# Patient Record
Sex: FEMALE | Race: WHITE | NOT HISPANIC OR LATINO | Employment: OTHER | URBAN - METROPOLITAN AREA
[De-identification: names, ages, dates, MRNs, and addresses within clinical notes are randomized per-mention and may not be internally consistent; named-entity substitution may affect disease eponyms.]

---

## 2017-01-30 ENCOUNTER — APPOINTMENT (OUTPATIENT)
Dept: WOMENS IMAGING | Facility: HOSPITAL | Age: 67
End: 2017-01-30

## 2017-01-30 PROCEDURE — G0202 SCR MAMMO BI INCL CAD: HCPCS | Performed by: RADIOLOGY

## 2017-01-30 PROCEDURE — 77067 SCR MAMMO BI INCL CAD: CPT | Performed by: RADIOLOGY

## 2017-01-30 PROCEDURE — 77063 BREAST TOMOSYNTHESIS BI: CPT | Performed by: RADIOLOGY

## 2018-01-31 ENCOUNTER — APPOINTMENT (OUTPATIENT)
Dept: WOMENS IMAGING | Facility: HOSPITAL | Age: 68
End: 2018-01-31

## 2018-01-31 PROCEDURE — 77067 SCR MAMMO BI INCL CAD: CPT | Performed by: RADIOLOGY

## 2018-01-31 PROCEDURE — 77063 BREAST TOMOSYNTHESIS BI: CPT | Performed by: RADIOLOGY

## 2018-01-31 PROCEDURE — MDREVIEWSP: Performed by: RADIOLOGY

## 2019-02-06 ENCOUNTER — APPOINTMENT (OUTPATIENT)
Dept: WOMENS IMAGING | Facility: HOSPITAL | Age: 69
End: 2019-02-06

## 2019-02-06 PROCEDURE — 77067 SCR MAMMO BI INCL CAD: CPT | Performed by: RADIOLOGY

## 2019-02-06 PROCEDURE — 77063 BREAST TOMOSYNTHESIS BI: CPT | Performed by: RADIOLOGY

## 2020-02-11 ENCOUNTER — APPOINTMENT (OUTPATIENT)
Dept: WOMENS IMAGING | Facility: HOSPITAL | Age: 70
End: 2020-02-11

## 2020-02-11 PROCEDURE — 77067 SCR MAMMO BI INCL CAD: CPT | Performed by: RADIOLOGY

## 2020-02-11 PROCEDURE — 77063 BREAST TOMOSYNTHESIS BI: CPT | Performed by: RADIOLOGY

## 2020-02-26 ENCOUNTER — APPOINTMENT (OUTPATIENT)
Dept: WOMENS IMAGING | Facility: HOSPITAL | Age: 70
End: 2020-02-26

## 2020-02-26 PROCEDURE — 76641 ULTRASOUND BREAST COMPLETE: CPT | Performed by: RADIOLOGY

## 2020-02-26 PROCEDURE — G0279 TOMOSYNTHESIS, MAMMO: HCPCS | Performed by: RADIOLOGY

## 2020-02-26 PROCEDURE — 77065 DX MAMMO INCL CAD UNI: CPT | Performed by: RADIOLOGY

## 2020-03-18 ENCOUNTER — APPOINTMENT (OUTPATIENT)
Dept: WOMENS IMAGING | Facility: HOSPITAL | Age: 70
End: 2020-03-18

## 2020-03-18 PROCEDURE — 19081 BX BREAST 1ST LESION STRTCTC: CPT | Performed by: RADIOLOGY

## 2022-03-24 ENCOUNTER — OFFICE VISIT (OUTPATIENT)
Dept: CARDIOLOGY | Facility: CLINIC | Age: 72
End: 2022-03-24

## 2022-03-24 VITALS
DIASTOLIC BLOOD PRESSURE: 92 MMHG | HEART RATE: 81 BPM | SYSTOLIC BLOOD PRESSURE: 150 MMHG | HEIGHT: 63 IN | BODY MASS INDEX: 28.63 KG/M2 | WEIGHT: 161.6 LBS

## 2022-03-24 DIAGNOSIS — I10 ESSENTIAL HYPERTENSION: Primary | ICD-10-CM

## 2022-03-24 DIAGNOSIS — R00.2 PALPITATIONS: ICD-10-CM

## 2022-03-24 PROCEDURE — 99204 OFFICE O/P NEW MOD 45 MIN: CPT | Performed by: INTERNAL MEDICINE

## 2022-03-24 PROCEDURE — 93000 ELECTROCARDIOGRAM COMPLETE: CPT | Performed by: INTERNAL MEDICINE

## 2022-03-24 RX ORDER — SPIRONOLACTONE 25 MG/1
25 TABLET ORAL DAILY
Qty: 30 TABLET | Refills: 11 | Status: SHIPPED | OUTPATIENT
Start: 2022-03-24 | End: 2022-05-04

## 2022-03-24 RX ORDER — TIZANIDINE 2 MG/1
TABLET ORAL
COMMUNITY
Start: 2022-03-22

## 2022-03-24 RX ORDER — LOSARTAN POTASSIUM 25 MG/1
25 TABLET ORAL DAILY
COMMUNITY
Start: 2022-02-19 | End: 2022-03-24

## 2022-03-24 RX ORDER — GABAPENTIN 100 MG/1
CAPSULE ORAL 2 TIMES DAILY
COMMUNITY
Start: 2022-02-26

## 2022-04-01 ENCOUNTER — LAB (OUTPATIENT)
Dept: LAB | Facility: HOSPITAL | Age: 72
End: 2022-04-01

## 2022-04-01 DIAGNOSIS — I10 ESSENTIAL HYPERTENSION: ICD-10-CM

## 2022-04-01 LAB
ANION GAP SERPL CALCULATED.3IONS-SCNC: 10 MMOL/L (ref 5–15)
BUN SERPL-MCNC: 10 MG/DL (ref 8–23)
BUN/CREAT SERPL: 11.5 (ref 7–25)
CALCIUM SPEC-SCNC: 9.7 MG/DL (ref 8.6–10.5)
CHLORIDE SERPL-SCNC: 104 MMOL/L (ref 98–107)
CO2 SERPL-SCNC: 28 MMOL/L (ref 22–29)
CREAT SERPL-MCNC: 0.87 MG/DL (ref 0.57–1)
EGFRCR SERPLBLD CKD-EPI 2021: 72.2 ML/MIN/1.73
GLUCOSE SERPL-MCNC: 106 MG/DL (ref 65–99)
POTASSIUM SERPL-SCNC: 4 MMOL/L (ref 3.5–5.2)
SODIUM SERPL-SCNC: 142 MMOL/L (ref 136–145)

## 2022-04-01 PROCEDURE — 36415 COLL VENOUS BLD VENIPUNCTURE: CPT

## 2022-04-01 PROCEDURE — 80048 BASIC METABOLIC PNL TOTAL CA: CPT

## 2022-04-01 NOTE — PROGRESS NOTES
Called and spoke to her.  Gave her the results.  She feels much better on her current medications.  We are going to keep her regimen the same.  I will see her back in 6 months.    GM

## 2022-04-19 NOTE — PROGRESS NOTES
Date of Office Visit: 2022  Encounter Provider: Oswaldo Gonzalez MD  Place of Service: [facility] CARDIOLOGY  Patient Name: Astrid Webber  :1950    Chief complaint: Hypertension, palpitations.    History of Present Illness:    I again had the pleasure of seeing the patient in cardiology office on 3/24/2022.  She is a very pleasant 71 year-old female with a history of hypertension and hyperlipidemia who presents for follow-up.  The patient is a retired RN.  I also have not seen her since 2016, and she is considered a new patient today.    I initially saw the patient in 2016 for palpitations.  Her work-up was ultimately unrevealing, and her verapamil was adjusted which helped her symptoms.  She presents today with issues with her blood pressure.  She has had a multitude of personal issues recently, including the death of her , parents, and daughter all within a short time span.  Her blood pressure has been elevated, which she feels is very likely partially related to stress.  She was recently placed on losartan 100 mg/day, although her blood pressure bottomed out, and then this was reduced to 50 mg/day.  However, her blood pressure again bottomed out.  Her verapamil was then increased to twice a day, and she is on 25 mg of losartan.  She feels poorly on the losartan in general, and does not feel like this is a good medication for her.  Her systolic blood pressure has been in the 120s to 140s, although sometimes lower.  Her pulse is mainly been in the 80s.  She also does not want to take the verapamil twice a day unless absolutely necessary.  She has not had any chest pain or shortness of breath.    Past Medical History:   Diagnosis Date   • Colon polyp    • GERD (gastroesophageal reflux disease)    • Hyperlipidemia    • Hypertension        Past Surgical History:   Procedure Laterality Date   • ENDOSCOPY AND COLONOSCOPY  2013    Upper Gastrointestinal  "Endoscopy with Dr Ambika Durán @ [facility]     • HYSTERECTOMY         Current Outpatient Medications on File Prior to Visit   Medication Sig Dispense Refill   • Cetirizine HCl 10 MG capsule Take  by mouth.     • famotidine (PEPCID) 20 MG tablet Take 20 mg by mouth 2 (two) times a day.     • fluticasone (FLONASE) 50 MCG/ACT nasal spray 2 sprays into the nostril(s) as directed by provider As Needed for Rhinitis. Administer 2 sprays in each nostril for each dose.     • folic acid (FOLVITE) 1 MG tablet Take 1 mg by mouth.     • gabapentin (NEURONTIN) 100 MG capsule 2 (Two) Times a Day.     • HYDROcodone-acetaminophen (NORCO) 5-325 MG per tablet Take 1 tablet by mouth every 6 (six) hours.     • Multiple Minerals-Vitamins (MULTI GHAZAL MINERALS) tablet Take  by mouth.     • tiZANidine (ZANAFLEX) 2 MG tablet      • verapamil SR (CALAN-SR) 120 MG CR tablet Take 1 tablet by mouth every night. 30 tablet 11   • verapamil SR (CALAN-SR) 240 MG CR tablet Take 1 tablet by mouth every morning. 30 tablet 11   • vitamin C (ASCORBIC ACID) 250 MG tablet Take 500 mg by mouth.     • LORazepam (ATIVAN) 0.5 MG tablet Take 0.5 mg by mouth every 6 (six) hours.     • oxyCODONE-acetaminophen (PERCOCET)  MG per tablet Take 1 tablet by mouth.     • zolpidem (AMBIEN) 10 MG tablet Take 10 mg by mouth daily.       No current facility-administered medications on file prior to visit.     Allergies as of 03/24/2022   • (No Known Allergies)     Social History     Socioeconomic History   • Marital status: Unknown   Tobacco Use   • Smoking status: Former Smoker   • Smokeless tobacco: Never Used   • Tobacco comment: Quit in her early 30's;   caffeine use- denies   Substance and Sexual Activity   • Alcohol use: Yes     Comment: \"rare\"   • Drug use: No     Family History   Problem Relation Age of Onset   • Breast cancer Mother    • Other Mother         Type I diabetes    • Hypertension Mother    • Colon cancer Father    • Hypertension Sister  " "  • Hypertension Sister    • Hypertension Sister    • Colon cancer Brother    • Stroke Daughter        Review of Systems   Constitutional: Positive for malaise/fatigue.   All other systems reviewed and are negative.     Objective:     Vitals:    03/24/22 1033   BP: 150/92   BP Location: Left arm   Pulse: 81   Weight: 73.3 kg (161 lb 9.6 oz)   Height: 160 cm (63\")     Body mass index is 28.63 kg/m².    Constitutional:       Appearance: Healthy appearance. Well-developed.   Eyes:      Conjunctiva/sclera: Conjunctivae normal.   HENT:      Head: Normocephalic and atraumatic.   Pulmonary:      Effort: Pulmonary effort is normal.      Breath sounds: Normal breath sounds.   Cardiovascular:      Normal rate. Regular rhythm.      Murmurs: There is no murmur.      No gallop.   Edema:     Peripheral edema absent.   Abdominal:      Palpations: Abdomen is soft.      Tenderness: There is no abdominal tenderness.   Musculoskeletal:      Cervical back: Neck supple. Skin:     General: Skin is warm.   Neurological:      Mental Status: Alert and oriented to person, place, and time.   Psychiatric:         Behavior: Behavior normal.       Lab Review:     ECG 12 Lead    Date/Time: 3/24/2022 10:31 AM  Performed by: Oswaldo Gonzalez MD  Authorized by: Oswaldo Gonzalez MD   Comparison: compared with previous ECG from 5/12/2020  Similar to previous ECG  Rhythm: sinus rhythm  Rate: normal  BPM: 81  Other findings: left atrial abnormality    Clinical impression: non-specific ECG                 Cardiac Procedures:      Assessment:       Diagnosis Plan   1. Essential hypertension  Basic Metabolic Panel   2. Palpitations       Plan:       Again, the patient has had issues with her blood pressure as described above.  Her blood pressure bottomed out on losartan at both 100 mg and 50 mg doses.  She is now on 25 mg of losartan, as well as verapamil 240 mg in the morning and 120 mg in the evening.  She does not want to take verapamil twice a " day unless absolutely necessary, and she feels poorly on the losartan in general.  We discussed this in detail.  I feel an ideal solution would be to get her off of the losartan and try her on something else.  We can consolidate the verapamil to 360 mg once a day.  I will try her on spironolactone 25 mg/day to see if she feels better on this medication.  I will check a basic metabolic panel in 2 weeks, and call her to see if this is making any difference.  I will determine follow-up based on her response.

## 2022-04-21 ENCOUNTER — TELEPHONE (OUTPATIENT)
Dept: CARDIOLOGY | Facility: CLINIC | Age: 72
End: 2022-04-21

## 2022-04-21 NOTE — TELEPHONE ENCOUNTER
I feel an ideal solution would be to get her off of the losartan and try her on something else.  We can consolidate the verapamil to 360 mg once a day.  I will try her on spironolactone 25 mg/day to see if she feels better on this medication.    Pt called stating that she feels much better with the addition of Spironolactone, however she states that hse is still having palpitations and racing heart rate and would like to know if you would be ok with her taking an extra 120 mg of the Verapamil to help with the palpitations

## 2022-04-28 NOTE — TELEPHONE ENCOUNTER
I called spoke with pt and gave her message from Dr MARLEY.   She understood.   He concern  Was if B/P was still to low  Would be OK to hold Spironolactone ?   She will take B/P to see where it is before taking Spironolactone..   I verbally addressed with Dr MARLEY is OK to hold Spironolactone if b/p low.   Pt will keep us update if any issue arise  Epifanio MCKEON

## 2022-04-28 NOTE — TELEPHONE ENCOUNTER
04/27/22@ 4:07   Pt called stating that with increase of Verapamil to 400 mg  And Spironolactone 25 mg she states helped palpitations but  her B/P is low. She is having some SOA, dizzines and feels weird. Her b/p yesterday morning was 107/53 and yesterday afternoon  113/48.   She is taking her Verapmil in morning and not evening like before because of palpitations.   Please advise on what needs to ap?   Pt's call back # [phone]    Thanks Epifanio MCKEON

## 2022-04-28 NOTE — TELEPHONE ENCOUNTER
Epifanio,    Taking all the medication in the morning is likely driving her blood pressure too low.  Unfortunately, she will either have to take some of the verapamil in the afternoon or in the evening, or she will need to take all of the verapamil in the morning and push the spironolactone until the afternoon so that her blood pressure does not drop as low.  Can you please let her know?  Thanks.    GM

## 2022-05-04 ENCOUNTER — OFFICE VISIT (OUTPATIENT)
Dept: CARDIOLOGY | Facility: CLINIC | Age: 72
End: 2022-05-04

## 2022-05-04 VITALS
HEIGHT: 63 IN | SYSTOLIC BLOOD PRESSURE: 132 MMHG | WEIGHT: 161.8 LBS | HEART RATE: 87 BPM | BODY MASS INDEX: 28.67 KG/M2 | OXYGEN SATURATION: 99 % | DIASTOLIC BLOOD PRESSURE: 82 MMHG

## 2022-05-04 DIAGNOSIS — R00.2 PALPITATIONS: ICD-10-CM

## 2022-05-04 DIAGNOSIS — I10 ESSENTIAL HYPERTENSION: Primary | ICD-10-CM

## 2022-05-04 PROCEDURE — 99214 OFFICE O/P EST MOD 30 MIN: CPT | Performed by: NURSE PRACTITIONER

## 2022-05-04 NOTE — PROGRESS NOTES
"    CARDIOLOGY        Patient Name: Astrid Webber  :1950  Age: 71 y.o.  Primary Cardiologist: Rahgu Gonzalez MD  Encounter Provider:  LUPE Begum    Date of Service: 22          CHIEF COMPLAINT / REASON FOR OFFICE VISIT     Follow-up, Hypertension, and Palpitations      HISTORY OF PRESENT ILLNESS       HPI  Astrid Webber is a 71 y.o. female who presents today for reevaluation of hypertension.     Pt has a  history significant for hypertension, palpitations.    Patient was evaluated by Dr. Gonzalez 3/24/2022 where she was found to have elevated blood pressure readings.  At last appointment patient was placed on spironolactone and repeat BMP was ordered.  Patient then added an extra dose of verapamil to help control palpitations and then her blood pressure was too low.  She is now holding spironolactone as a result.    Patient states that she has held spironolactone for the last several days secondary to low blood pressure in lightheadedness.  She states that she has been adjusting her verapamil dose to try to control her heart palpitations.  Reports that this morning she took verapamil 360 mg/day and states that blood pressure has been controlled in the 130s.  She does state that she believes anxiety causes some of her palpitations but then the palpitations make her even more anxious.  Denies any dyspnea, angina, edema.  She denies caffeine use but admits that she does not drink enough water.      The following portions of the patient's history were reviewed and updated as appropriate: allergies, current medications, past family history, past medical history, past social history, past surgical history and problem list.      VITAL SIGNS     Visit Vitals  /82   Pulse 87   Ht 160 cm (62.99\")   Wt 73.4 kg (161 lb 12.8 oz)   SpO2 99%   BMI 28.67 kg/m²         Wt Readings from Last 3 Encounters:   22 73.4 kg (161 lb 12.8 oz)   22 73.3 kg (161 lb 9.6 oz)   16 71.9 kg (158 " lb 8 oz)     Body mass index is 28.67 kg/m².      REVIEW OF SYSTEMS   Review of Systems   Constitutional: Negative for chills, fever, weight gain and weight loss.   Cardiovascular: Positive for palpitations. Negative for leg swelling.   Respiratory: Negative for cough, snoring and wheezing.    Hematologic/Lymphatic: Negative for bleeding problem. Does not bruise/bleed easily.   Skin: Negative for color change.   Musculoskeletal: Negative for falls, joint pain and myalgias.   Gastrointestinal: Negative for melena.   Genitourinary: Negative for hematuria.   Neurological: Positive for light-headedness. Negative for excessive daytime sleepiness.   Psychiatric/Behavioral: Negative for depression. The patient is not nervous/anxious.            PHYSICAL EXAMINATION     Vitals and nursing note reviewed.   Constitutional:       Appearance: Normal appearance. Well-developed.   Eyes:      Conjunctiva/sclera: Conjunctivae normal.   Neck:      Vascular: No carotid bruit.   Pulmonary:      Breath sounds: Normal breath sounds.   Cardiovascular:      Normal rate. Regular rhythm. Normal S1 with normal intensity. Normal S2 with normal intensity.      Murmurs: There is no murmur.      No gallop. No click. No rub.   Edema:     Peripheral edema absent.   Musculoskeletal: Normal range of motion. Skin:     General: Skin is warm and dry.   Neurological:      Mental Status: Alert and oriented to person, place, and time.      GCS: GCS eye subscore is 4. GCS verbal subscore is 5. GCS motor subscore is 6.   Psychiatric:         Speech: Speech normal.         Behavior: Behavior normal.         Thought Content: Thought content normal.         Judgment: Judgment normal.           REVIEWED DATA     Procedures    Cardiac Procedures:  1. Echocardiogram 6/15/2016.  All LV wall segments contract normally.  LVEF 56%.      BUN   Date Value Ref Range Status   04/01/2022 10 8 - 23 mg/dL Final   09/17/2020 11 7 - 20 mg/dL Final     Creatinine   Date Value  Ref Range Status   04/01/2022 0.87 0.57 - 1.00 mg/dL Final   09/17/2020 0.70 0.7 - 1.5 mg/dL Final     Potassium   Date Value Ref Range Status   04/01/2022 4.0 3.5 - 5.2 mmol/L Final   09/17/2020 3.5 3.5 - 5.1 mmol/L Final     ALT (SGPT)   Date Value Ref Range Status   09/17/2020 51 (H) 0 - 35 U/L Final     Comment:     If ALT testing ordered prior to 2359 on 11/16/19, please use reference range: Male 0-50, Female 0-35.  [facility] switched to a new ALT assay on 11/16/19 which yields results 10 U/L lower than the old assay.     AST (SGOT)   Date Value Ref Range Status   09/17/2020 46 15 - 46 U/L Final           ASSESSMENT & PLAN      Diagnosis Plan   1. Essential hypertension     2. Palpitations           SUMMARY/DISCUSSION  1. Hypertension.  Blood pressure currently controlled with verapamil 360 mg/day.  She did take spironolactone which improved her blood pressure readings but then caused hypotension.  Patient notes to monitor her blood pressure to ensure systolic blood pressure less than 140.  She will make adjustments to verapamil dosing based on palpitations and will take an additional 120 mg tablet for systolic blood pressure greater than 145.  2. Palpitations.  Patient states that palpitations have increased over the past few weeks.  She is now on verapamil 360 mg in the morning and the seem to be more controlled.  She was educated that inadequate water intake will make palpitations worse and I do wonder if adding spironolactone may have dehydrated the patient to cause worsening palpitations.  She will use verapamil 120 mg tablet as needed in addition to her daily dose for palpitations.  3. Encouraged increased water intake.  Keep previously scheduled appointment with Dr. Gonzalez.        MEDICATIONS         Discharge Medications          Accurate as of May 4, 2022  9:46 AM. If you have any questions, ask your nurse or doctor.            Continue These Medications      Instructions Start  Date   Cetirizine HCl 10 MG capsule   Oral      famotidine 20 MG tablet  Commonly known as: PEPCID   20 mg, Oral, 2 Times Daily      fluticasone 50 MCG/ACT nasal spray  Commonly known as: FLONASE   2 sprays, Nasal, As Needed, Administer 2 sprays in each nostril for each dose.       folic acid 1 MG tablet  Commonly known as: FOLVITE   1 mg, Oral      gabapentin 100 MG capsule  Commonly known as: NEURONTIN   2 Times Daily      HYDROcodone-acetaminophen 5-325 MG per tablet  Commonly known as: NORCO   1 tablet, Oral, Every 6 Hours      LORazepam 0.5 MG tablet  Commonly known as: ATIVAN   0.5 mg, Oral, Every 6 Hours      Multi Kadeem Minerals tablet   Oral      oxyCODONE-acetaminophen  MG per tablet  Commonly known as: PERCOCET   1 tablet, Oral      tiZANidine 2 MG tablet  Commonly known as: ZANAFLEX   No dose, route, or frequency recorded.      verapamil  MG CR tablet  Commonly known as: CALAN-SR   240 mg, Oral, Every Morning      verapamil  MG CR tablet  Commonly known as: CALAN-SR   120 mg, Oral, Nightly      vitamin C 250 MG tablet  Commonly known as: ASCORBIC ACID   500 mg, Oral      zolpidem 10 MG tablet  Commonly known as: AMBIEN   10 mg, Oral, Daily         Stop These Medications    spironolactone 25 MG tablet  Commonly known as: ALDACTONE  Stopped by: LUPE Begum                **Dragon Disclaimer:   Much of this encounter note is an electronic transcription/translation of spoken language to printed text. The electronic translation of spoken language may permit erroneous, or at times, nonsensical words or phrases to be inadvertently transcribed. Although I have reviewed the note for such errors, some may still exist.

## 2022-11-07 PROBLEM — R00.2 PALPITATIONS: Status: ACTIVE | Noted: 2022-11-07

## 2022-11-07 PROBLEM — I10 PRIMARY HYPERTENSION: Status: ACTIVE | Noted: 2022-11-07

## 2022-11-07 NOTE — PROGRESS NOTES
Subjective:     Encounter Date:11/08/2022      Patient ID: Astrid Webber is a 72 y.o. female.    Chief Complaint:follow up HTN, palpitations  History of Present Illness  This is a 73 y/o woman who follows with Dr. Gonzalez and is new to me today. He will be following with .... moving forward. He has a pmhx of hypertension and palpitations. In March 2022, she saw Dr. Gonzalez in office and had elevated blood pressure readings. She was started on spironolactone. She had complaints of palpitations and an extra dose of verapamil was added to control her palpitations. As a result, she became hypotensive and spironolactone had to be held. She then saw LUPE Lester in office in May 2022 and she was continuing to hold spironolactone due to low blood pressure. She had been adjusting her verapamil dose to control her palpitations. She was instructed to continue monitoring her blood pressure and take an additional 120mg tab of verapamil for palpitations if BP greater than 145.    She is here today for a follow up visit. She reports improvement in her blood pressure control. She is taking spironolactone 25 mg daily and verapamil 360 mg daily. She takes both in the morning and her blood pressure remains controlled throughout the day and evening. She denies any chest pain, shortness of breath or palpitations. She denies dizziness or syncope. She denies lower extremity edema, orthopnea or PND. She expresses she knows she needs to lose weight but it is difficult for her to exercise due to her osteoarthritis. She goes to pain management for her OA and is on Norco which works well. She has a lot of family stressors including losing her daughter last year and her sister had a stroke recently. Despite this, her blood pressure has been good.    I have reviewed and updated as appropriate allergies, current medications, past family history, past medical history, past surgical history and problem list.    Review of Systems    Constitutional: Negative for fever, malaise/fatigue, weight gain and weight loss.   HENT: Negative for congestion, hoarse voice and sore throat.    Eyes: Negative for blurred vision and double vision.   Cardiovascular: Negative for chest pain, dyspnea on exertion, leg swelling, orthopnea, palpitations and syncope.   Respiratory: Negative for cough, shortness of breath and wheezing.    Musculoskeletal: Positive for joint pain.   Gastrointestinal: Negative for abdominal pain, hematemesis, hematochezia and melena.   Genitourinary: Negative for dysuria and hematuria.   Neurological: Negative for dizziness, headaches, light-headedness and numbness.   Psychiatric/Behavioral: Negative for depression. The patient is not nervous/anxious.          Current Outpatient Medications:   •  Cetirizine HCl 10 MG capsule, Take  by mouth., Disp: , Rfl:   •  famotidine (PEPCID) 20 MG tablet, Take 20 mg by mouth 2 (two) times a day., Disp: , Rfl:   •  folic acid (FOLVITE) 1 MG tablet, Take 1 mg by mouth., Disp: , Rfl:   •  gabapentin (NEURONTIN) 100 MG capsule, 2 (Two) Times a Day., Disp: , Rfl:   •  HYDROcodone-acetaminophen (NORCO) 5-325 MG per tablet, Take 1 tablet by mouth every 6 (six) hours., Disp: , Rfl:   •  Multiple Minerals-Vitamins (MULTI GHAZAL MINERALS) tablet, Take  by mouth., Disp: , Rfl:   •  spironolactone (ALDACTONE) 25 MG tablet, Take 1 tablet by mouth Daily., Disp: , Rfl:   •  tiZANidine (ZANAFLEX) 2 MG tablet, , Disp: , Rfl:   •  verapamil SR (CALAN-SR) 120 MG CR tablet, Take 1 tablet by mouth every night., Disp: 30 tablet, Rfl: 11  •  verapamil SR (CALAN-SR) 240 MG CR tablet, Take 1 tablet by mouth every morning., Disp: 30 tablet, Rfl: 11  •  vitamin C (ASCORBIC ACID) 250 MG tablet, Take 500 mg by mouth., Disp: , Rfl:     Past Medical History:   Diagnosis Date   • Colon polyp    • GERD (gastroesophageal reflux disease)    • Hyperlipidemia    • Hypertension        Past Surgical History:   Procedure Laterality Date  "  • ENDOSCOPY AND COLONOSCOPY  04/22/2013    Upper Gastrointestinal Endoscopy with Dr Ambika Durán @ [facility]     • HYSTERECTOMY         Family History   Problem Relation Age of Onset   • Breast cancer Mother    • Other Mother         Type I diabetes    • Hypertension Mother    • Colon cancer Father    • Hypertension Sister    • Hypertension Sister    • Hypertension Sister    • Colon cancer Brother    • Stroke Daughter        Social History     Tobacco Use   • Smoking status: Former   • Smokeless tobacco: Never   • Tobacco comments:     Quit in her early 30's;   caffeine use- denies   Substance Use Topics   • Alcohol use: Yes     Comment: \"rare\"   • Drug use: No         ECG 12 Lead    Date/Time: 11/8/2022 11:46 AM  Performed by: Danna Rodriguez APRN  Authorized by: Danna Rodriguez APRN   Comparison: compared with previous ECG from 3/24/2022  Similar to previous ECG  Rhythm: sinus rhythm  Other findings: left atrial abnormality  Comments: No significant change from previous EKG               Objective:     Visit Vitals  /60 (BP Location: Left arm)   Pulse 85   Ht 160 cm (63\")   Wt 71.7 kg (158 lb)   BMI 27.99 kg/m²             Physical Exam  Constitutional:       Appearance: Normal appearance. She is normal weight.   HENT:      Head: Normocephalic.   Neck:      Vascular: No carotid bruit.   Cardiovascular:      Rate and Rhythm: Normal rate and regular rhythm.      Chest Wall: PMI is not displaced.      Pulses: Normal pulses.           Radial pulses are 2+ on the right side and 2+ on the left side.        Posterior tibial pulses are 2+ on the right side and 2+ on the left side.      Heart sounds: Normal heart sounds. No murmur heard.    No friction rub. No gallop.   Pulmonary:      Effort: Pulmonary effort is normal.      Breath sounds: Normal breath sounds.   Abdominal:      General: Bowel sounds are normal. There is no distension.      Palpations: Abdomen is soft.   Musculoskeletal:      Right lower " leg: No edema.      Left lower leg: No edema.   Skin:     General: Skin is warm and dry.      Capillary Refill: Capillary refill takes less than 2 seconds.   Neurological:      Mental Status: She is alert and oriented to person, place, and time.   Psychiatric:         Mood and Affect: Mood normal.         Behavior: Behavior normal.         Thought Content: Thought content normal.          Lab Review:         Cardiac Procedures:   1. Echocardiogram 06/15/16:  • All left ventricular wall segments contract normally.  • Left ventricular function is normal. Estimated EF = 56%.      Assessment:         Diagnoses and all orders for this visit:    1. Palpitations (Primary)    2. Primary hypertension            Plan:       1. Hypertension: blood pressure is well controlled on current medication regimen of spironolactone 25 and verapamil 360. No changes at this time.  2. Palpitations: seem to have resolved with improved blood pressure control. She never has to take the extra 120 of verapamil for palpitations. Will continue to monitor for now.    Thank you for allowing me to participate in this patient's care. Please call with any questions or concerns. Ms. Webber will follow up with Dr. Rosenbaum in 6 months          Your medication list          Accurate as of November 8, 2022 11:41 AM. If you have any questions, ask your nurse or doctor.            CONTINUE taking these medications      Instructions Last Dose Given Next Dose Due   Cetirizine HCl 10 MG capsule      Take  by mouth.       famotidine 20 MG tablet  Commonly known as: PEPCID      Take 20 mg by mouth 2 (two) times a day.       folic acid 1 MG tablet  Commonly known as: FOLVITE      Take 1 mg by mouth.       gabapentin 100 MG capsule  Commonly known as: NEURONTIN      2 (Two) Times a Day.       HYDROcodone-acetaminophen 5-325 MG per tablet  Commonly known as: NORCO      Take 1 tablet by mouth every 6 (six) hours.       Multi Kadeem Minerals tablet      Take  by  mouth.       spironolactone 25 MG tablet  Commonly known as: ALDACTONE      Take 1 tablet by mouth Daily.       tiZANidine 2 MG tablet  Commonly known as: ZANAFLEX           verapamil  MG CR tablet  Commonly known as: CALAN-SR      Take 1 tablet by mouth every morning.       verapamil  MG CR tablet  Commonly known as: CALAN-SR      Take 1 tablet by mouth every night.       vitamin C 250 MG tablet  Commonly known as: ASCORBIC ACID      Take 500 mg by mouth.          STOP taking these medications    fluticasone 50 MCG/ACT nasal spray  Commonly known as: FLONASE  Stopped by: LUPE Hodgson        LORazepam 0.5 MG tablet  Commonly known as: ATIVAN  Stopped by: LUPE Hodgson        oxyCODONE-acetaminophen  MG per tablet  Commonly known as: PERCOCET  Stopped by: LUPE Hodgson        zolpidem 10 MG tablet  Commonly known as: AMBIEN  Stopped by: LUPE Hodgson APRN  11/08/22  2:19 PM EST

## 2022-11-08 ENCOUNTER — OFFICE VISIT (OUTPATIENT)
Dept: CARDIOLOGY | Facility: CLINIC | Age: 72
End: 2022-11-08

## 2022-11-08 VITALS
HEART RATE: 85 BPM | WEIGHT: 158 LBS | DIASTOLIC BLOOD PRESSURE: 60 MMHG | HEIGHT: 63 IN | BODY MASS INDEX: 28 KG/M2 | SYSTOLIC BLOOD PRESSURE: 130 MMHG

## 2022-11-08 DIAGNOSIS — I10 PRIMARY HYPERTENSION: ICD-10-CM

## 2022-11-08 DIAGNOSIS — R00.2 PALPITATIONS: Primary | ICD-10-CM

## 2022-11-08 PROCEDURE — 99214 OFFICE O/P EST MOD 30 MIN: CPT | Performed by: NURSE PRACTITIONER

## 2022-11-08 PROCEDURE — 93000 ELECTROCARDIOGRAM COMPLETE: CPT | Performed by: NURSE PRACTITIONER

## 2022-11-08 RX ORDER — SPIRONOLACTONE 25 MG/1
25 TABLET ORAL DAILY
COMMUNITY
Start: 2022-09-12 | End: 2022-12-20

## 2022-12-20 RX ORDER — SPIRONOLACTONE 25 MG/1
25 TABLET ORAL DAILY
Qty: 30 TABLET | Refills: 3 | Status: SHIPPED | OUTPATIENT
Start: 2022-12-20

## 2023-04-10 RX ORDER — SPIRONOLACTONE 25 MG/1
25 TABLET ORAL DAILY
Qty: 30 TABLET | Refills: 3 | Status: SHIPPED | OUTPATIENT
Start: 2023-04-10

## 2023-05-23 ENCOUNTER — OFFICE VISIT (OUTPATIENT)
Dept: CARDIOLOGY | Facility: CLINIC | Age: 73
End: 2023-05-23
Payer: MEDICARE

## 2023-05-23 VITALS
WEIGHT: 160 LBS | SYSTOLIC BLOOD PRESSURE: 164 MMHG | HEART RATE: 87 BPM | BODY MASS INDEX: 28.35 KG/M2 | OXYGEN SATURATION: 99 % | DIASTOLIC BLOOD PRESSURE: 90 MMHG | HEIGHT: 63 IN

## 2023-05-23 DIAGNOSIS — I10 PRIMARY HYPERTENSION: Primary | ICD-10-CM

## 2023-05-23 PROCEDURE — 3077F SYST BP >= 140 MM HG: CPT | Performed by: INTERNAL MEDICINE

## 2023-05-23 PROCEDURE — 3080F DIAST BP >= 90 MM HG: CPT | Performed by: INTERNAL MEDICINE

## 2023-05-23 PROCEDURE — 99214 OFFICE O/P EST MOD 30 MIN: CPT | Performed by: INTERNAL MEDICINE

## 2023-05-23 NOTE — PROGRESS NOTES
"      CARDIOLOGY    Raghav Rosenbaum MD    ENCOUNTER DATE:  05/23/2023    Astrid Webber / 72 y.o. / female        CHIEF COMPLAINT / REASON FOR OFFICE VISIT     Palpitations (11/08/2022 Follow up)  Hypertension    HISTORY OF PRESENT ILLNESS       HPI  Astrid Webber is a 72 y.o. female who presents today for reevaluation.  Patient seen Dr. Gonzalez before.  Patient had palpitations after the death of her daughter.  She said recently this has been significantly better she occasionally feels some palpitations but they are very tolerable.  Her blood pressure was elevated today she said this is pretty common when she goes to the physicians office.  She frequently takes at home and usually runs about 122/70 on the average.      The following portions of the patient's history were reviewed and updated as appropriate: allergies, current medications, past family history, past medical history, past social history, past surgical history and problem list.      VITAL SIGNS     Visit Vitals  /90 (BP Location: Left arm)   Pulse 87   Ht 160 cm (63\")   Wt 72.6 kg (160 lb)   SpO2 99%   BMI 28.34 kg/m²         Wt Readings from Last 3 Encounters:   05/23/23 72.6 kg (160 lb)   11/08/22 71.7 kg (158 lb)   05/04/22 73.4 kg (161 lb 12.8 oz)     Body mass index is 28.34 kg/m².      REVIEW OF SYSTEMS   ROS        PHYSICAL EXAMINATION     Vitals reviewed.   Constitutional:       Appearance: Healthy appearance.   Pulmonary:      Effort: Pulmonary effort is normal.   Cardiovascular:      Normal rate. Regular rhythm. Normal S1. Normal S2.      Murmurs: There is no murmur.      No gallop. No click. No rub.   Pulses:     Intact distal pulses.   Edema:     Peripheral edema absent.   Neurological:      Mental Status: Alert and oriented to person, place and time.           REVIEWED DATA     Procedures    Cardiac Procedures:  1.           ASSESSMENT & PLAN      Diagnosis Plan   1. Primary hypertension      "         SUMMARY/DISCUSSION  1. Hypertension blood pressure is elevated today she says it usually occurs when she goes to physicians offices.  She does frequently check at home usually runs about 122/70.  2. Palpitations stable  3. Patient was a little nervous because this is the first time I met her clinically she is doing well we will see her back in 1 year sooner if issues occur or her blood pressure elevates.        MEDICATIONS         Discharge Medications          Accurate as of May 23, 2023 11:12 AM. If you have any questions, ask your nurse or doctor.            Continue These Medications      Instructions Start Date   Cetirizine HCl 10 MG capsule   Oral      famotidine 20 MG tablet  Commonly known as: PEPCID   20 mg, Oral, 2 Times Daily      folic acid 1 MG tablet  Commonly known as: FOLVITE   1 mg, Oral      gabapentin 100 MG capsule  Commonly known as: NEURONTIN   2 Times Daily      HYDROcodone-acetaminophen 5-325 MG per tablet  Commonly known as: NORCO   1 tablet, Oral, Every 6 Hours      Multi Kadeem Minerals tablet   Oral      spironolactone 25 MG tablet  Commonly known as: ALDACTONE   25 mg, Oral, Daily      tiZANidine 2 MG tablet  Commonly known as: ZANAFLEX   No dose, route, or frequency recorded.      verapamil  MG CR tablet  Commonly known as: CALAN-SR   240 mg, Oral, Every Morning      verapamil  MG CR tablet  Commonly known as: CALAN-SR   120 mg, Oral, Nightly      vitamin C 250 MG tablet  Commonly known as: ASCORBIC ACID   500 mg, Oral                 **Dragon Disclaimer:   Much of this encounter note is an electronic transcription/translation of spoken language to printed text. The electronic translation of spoken language may permit erroneous, or at times, nonsensical words or phrases to be inadvertently transcribed. Although I have reviewed the note for such errors, some may still exist.

## 2024-02-05 RX ORDER — SPIRONOLACTONE 25 MG/1
25 TABLET ORAL DAILY
Qty: 30 TABLET | Refills: 3 | Status: SHIPPED | OUTPATIENT
Start: 2024-02-05

## 2024-02-05 NOTE — TELEPHONE ENCOUNTER
Diuretics Protocol [id number]/05/2024 11:37 AM   Protocol Details Normal serum potassium in past 12 months    Normal serum sodium in past 12 months    GFR> 30 ml/min in past year    No active pregnancy on record    No positive pregnancy test in past 12 months    Recent or future visit with authorizing provider    Blood pressure on record

## 2024-02-05 NOTE — TELEPHONE ENCOUNTER
Caller: Astrid Webber    Relationship: Self    Best call back number: [phone] (home)     Requested Prescriptions:   Requested Prescriptions     Pending Prescriptions Disp Refills    spironolactone (ALDACTONE) 25 MG tablet 30 tablet 3     Sig: Take 1 tablet by mouth Daily.        Pharmacy where request should be sent: [id number] [place], [place] - [address] [facility] AT [facility] [phone] [facility] [phone]      Last office visit with prescribing clinician: 5/4/2022   Last telemedicine visit with prescribing clinician: Visit date not found   Next office visit with prescribing clinician: 5/24/2024     Additional details provided by patient: PATIENT HAS 3 DAYS LEFT OF MEDICATION. PLEASE SEND OVER REFILL, PHARMACY IS HAVING ISSUES REQUESTING MEDICATION. THANK YOU!    Does the patient have less than a 3 day supply:  [x] Yes  [] No    Would you like a call back once the refill request has been completed: [] Yes [x] No    If the office needs to give you a call back, can they leave a voicemail: [] Yes [x] No    Efren Calhoun Rep   02/05/24 11:36 EST

## 2024-05-10 RX ORDER — SPIRONOLACTONE 25 MG/1
25 TABLET ORAL DAILY
Qty: 30 TABLET | Refills: 3 | Status: SHIPPED | OUTPATIENT
Start: 2024-05-10

## 2024-07-30 ENCOUNTER — OFFICE VISIT (OUTPATIENT)
Dept: CARDIOLOGY | Facility: CLINIC | Age: 74
End: 2024-07-30
Payer: MEDICARE

## 2024-07-30 VITALS
DIASTOLIC BLOOD PRESSURE: 78 MMHG | HEIGHT: 63 IN | OXYGEN SATURATION: 97 % | WEIGHT: 155.8 LBS | HEART RATE: 78 BPM | SYSTOLIC BLOOD PRESSURE: 136 MMHG | BODY MASS INDEX: 27.61 KG/M2

## 2024-07-30 DIAGNOSIS — R00.2 PALPITATIONS: Primary | ICD-10-CM

## 2024-07-30 DIAGNOSIS — I10 PRIMARY HYPERTENSION: ICD-10-CM

## 2024-07-30 PROCEDURE — 99214 OFFICE O/P EST MOD 30 MIN: CPT | Performed by: NURSE PRACTITIONER

## 2024-07-30 PROCEDURE — 3078F DIAST BP <80 MM HG: CPT | Performed by: NURSE PRACTITIONER

## 2024-07-30 PROCEDURE — 3075F SYST BP GE 130 - 139MM HG: CPT | Performed by: NURSE PRACTITIONER

## 2024-07-30 PROCEDURE — 93000 ELECTROCARDIOGRAM COMPLETE: CPT | Performed by: NURSE PRACTITIONER

## 2024-07-30 RX ORDER — VERAPAMIL HYDROCHLORIDE 240 MG/1
240 TABLET, FILM COATED, EXTENDED RELEASE ORAL EVERY MORNING
Qty: 90 TABLET | Refills: 3 | Status: SHIPPED | OUTPATIENT
Start: 2024-07-30

## 2024-07-30 RX ORDER — METHOTREXATE 2.5 MG/1
TABLET ORAL
COMMUNITY

## 2024-07-30 RX ORDER — SPIRONOLACTONE 25 MG/1
25 TABLET ORAL DAILY
Qty: 90 TABLET | Refills: 3 | Status: SHIPPED | OUTPATIENT
Start: 2024-07-30

## 2024-07-30 RX ORDER — PREDNISOLONE ACETATE 10 MG/ML
SUSPENSION/ DROPS OPHTHALMIC
COMMUNITY
Start: 2024-06-12

## 2024-07-30 RX ORDER — FLUTICASONE PROPIONATE 50 MCG
1 SPRAY, SUSPENSION (ML) NASAL DAILY
COMMUNITY
Start: 2024-06-20

## 2024-07-30 NOTE — ASSESSMENT & PLAN NOTE
Palpitations have been well-controlled with current regimen  Continue verapamil 360 mg total for the day.

## 2024-07-30 NOTE — PROGRESS NOTES
"    CARDIOLOGY        Patient Name: Astrid Webber  :1950  Age: 74 y.o.  Primary Cardiologist: Raghav Rosenbaum MD and Raghu Gonzalez MD  Encounter Provider:  LUPE Begum    Date of Service:24        CHIEF COMPLAINT / REASON FOR OFFICE VISIT     Follow-Up and Hypertension      HISTORY OF PRESENT ILLNESS       HPI  Astrid Webber is a 74 y.o. female who presents today for annual evaluation.    Pt has a  history significant for hypertension, palpitations.    Patient has been following me for several years for both hypertension and palpitations.  She has been maintained with spironolactone and verapamil.    Patient reports that she has done well since last assessment.  She actually notes that her palpitations have been very well-managed and controlled.  She does monitor her blood pressure routinely where it averages in the 130s/70s.  She did unfortunately does not exercise.  She is otherwise asymptomatic.    The following portions of the patient's history were reviewed and updated as appropriate: allergies, current medications, past family history, past medical history, past social history, past surgical history and problem list.      VITAL SIGNS     Visit Vitals  /78   Pulse 78   Ht 160 cm (63\")   Wt 70.7 kg (155 lb 12.8 oz)   SpO2 97%   BMI 27.60 kg/m²         Wt Readings from Last 3 Encounters:   24 70.7 kg (155 lb 12.8 oz)   23 72.6 kg (160 lb)   22 71.7 kg (158 lb)     Body mass index is 27.6 kg/m².      REVIEW OF SYSTEMS   Review of Systems   Constitutional: Negative for chills, fever, weight gain and weight loss.   Cardiovascular:  Negative for leg swelling.   Respiratory:  Negative for cough, snoring and wheezing.    Hematologic/Lymphatic: Negative for bleeding problem. Does not bruise/bleed easily.   Skin:  Negative for color change.   Musculoskeletal:  Negative for falls, joint pain and myalgias.   Gastrointestinal:  Negative for melena.   Genitourinary:  " Negative for hematuria.   Neurological:  Negative for excessive daytime sleepiness.   Psychiatric/Behavioral:  Negative for depression. The patient is not nervous/anxious.            PHYSICAL EXAMINATION     Vitals and nursing note reviewed.   Constitutional:       Appearance: Normal appearance. Well-developed.   Eyes:      Conjunctiva/sclera: Conjunctivae normal.   Neck:      Vascular: No carotid bruit.   Pulmonary:      Breath sounds: Normal breath sounds.   Cardiovascular:      Normal rate. Regular rhythm. Normal S1 with normal intensity. Normal S2 with normal intensity.       Murmurs: There is no murmur.      No gallop.  No click. No rub.   Edema:     Peripheral edema absent.   Musculoskeletal: Normal range of motion. Skin:     General: Skin is warm and dry.   Neurological:      Mental Status: Alert and oriented to person, place, and time.      GCS: GCS eye subscore is 4. GCS verbal subscore is 5. GCS motor subscore is 6.   Psychiatric:         Speech: Speech normal.         Behavior: Behavior normal.         Thought Content: Thought content normal.         Judgment: Judgment normal.           REVIEWED DATA       ECG 12 Lead    Date/Time: 7/30/2024 1:18 PM  Performed by: Ely Garcia APRN    Authorized by: Ely Garcia APRN  Comparison: compared with previous ECG   Rhythm: sinus rhythm  Rate: normal  Conduction: conduction normal  ST Segments: ST segments normal  QRS axis: normal    Clinical impression: normal ECG          Cardiac Procedures:  Echocardiogram 6/15/2016.  All LV wall segments contract normally.  LVEF 56%.      BUN   Date Value Ref Range Status   11/16/2022 11 10 - 20 mg/dL Final     Creatinine   Date Value Ref Range Status   11/16/2022 0.85 0.55 - 1.02 mg/dL Final     Potassium   Date Value Ref Range Status   11/16/2022 4.1 3.5 - 5.1 mmol/L Final     ALT (SGPT)   Date Value Ref Range Status   11/16/2022 12 10 - 35 U/L Final     AST (SGOT)   Date Value Ref Range Status   11/16/2022 22  10 - 35 U/L Final           ASSESSMENT & PLAN     Diagnoses and all orders for this visit:    1. Palpitations (Primary)  Assessment & Plan:  Palpitations have been well-controlled with current regimen  Continue verapamil 360 mg total for the day.      2. Primary hypertension  Assessment & Plan:  BP has been controlled at home and is also controlled at the clinic  Continue the following regimen:  Verapamil 360 mg/day  Spironolactone 25 mg/day      Other orders  -     spironolactone (ALDACTONE) 25 MG tablet; Take 1 tablet by mouth Daily.  Dispense: 90 tablet; Refill: 3  -     verapamil SR (CALAN-SR) 120 MG CR tablet; Take 1 tablet by mouth Every Night.  Dispense: 90 tablet; Refill: 3  -     verapamil SR (CALAN-SR) 240 MG CR tablet; Take 1 tablet by mouth Every Morning.  Dispense: 90 tablet; Refill: 3  -     ECG 12 Lead        Return in about 1 year (around 7/30/2025) for Dr. Rosenbaum- Routine.    Future Appointments         Provider Department Center    8/4/2025 11:40 AM Raghav Rosenbaum MD [facility] CARDIOLOGY [facility]              MEDICATIONS         Discharge Medications            Accurate as of July 30, 2024  1:45 PM. If you have any questions, ask your nurse or doctor.                Continue These Medications        Instructions Start Date   Cetirizine HCl 10 MG capsule   Oral      famotidine 20 MG tablet  Commonly known as: PEPCID   20 mg, Oral, 2 Times Daily      fluticasone 50 MCG/ACT nasal spray  Commonly known as: FLONASE   1 spray, Each Nare, Daily, Shake before using.      folic acid 1 MG tablet  Commonly known as: FOLVITE   1 mg, Oral      gabapentin 100 MG capsule  Commonly known as: NEURONTIN   2 Times Daily      HYDROcodone-acetaminophen 5-325 MG per tablet  Commonly known as: NORCO   1 tablet, Oral, Every 6 Hours      methotrexate 2.5 MG tablet   TAKE 8 TABLETS BY MOUTH WEEKLY      Multi Kadeem Minerals tablet   Oral      prednisoLONE acetate 1 % ophthalmic suspension  Commonly known  as: PRED FORTE       spironolactone 25 MG tablet  Commonly known as: ALDACTONE   25 mg, Oral, Daily      tiZANidine 2 MG tablet  Commonly known as: ZANAFLEX   No dose, route, or frequency recorded.      verapamil  MG CR tablet  Commonly known as: CALAN-SR   120 mg, Oral, Nightly      verapamil  MG CR tablet  Commonly known as: CALAN-SR   240 mg, Oral, Every Morning      vitamin C 250 MG tablet  Commonly known as: ASCORBIC ACID   500 mg, Oral, She is taking 500 mg daily                   **Dragon Disclaimer:   Much of this encounter note is an electronic transcription/translation of spoken language to printed text. The electronic translation of spoken language may permit erroneous, or at times, nonsensical words or phrases to be inadvertently transcribed. Although I have reviewed the note for such errors, some may still exist.

## 2024-07-30 NOTE — ASSESSMENT & PLAN NOTE
BP has been controlled at home and is also controlled at the clinic  Continue the following regimen:  Verapamil 360 mg/day  Spironolactone 25 mg/day

## 2025-08-04 ENCOUNTER — OFFICE VISIT (OUTPATIENT)
Dept: CARDIOLOGY | Age: 75
End: 2025-08-04
Payer: MEDICARE

## 2025-08-04 VITALS
WEIGHT: 150 LBS | BODY MASS INDEX: 26.58 KG/M2 | HEART RATE: 86 BPM | DIASTOLIC BLOOD PRESSURE: 78 MMHG | HEIGHT: 63 IN | SYSTOLIC BLOOD PRESSURE: 152 MMHG

## 2025-08-04 DIAGNOSIS — I10 PRIMARY HYPERTENSION: Primary | ICD-10-CM

## 2025-08-04 DIAGNOSIS — R00.2 PALPITATIONS: ICD-10-CM

## 2025-08-04 PROCEDURE — 93000 ELECTROCARDIOGRAM COMPLETE: CPT | Performed by: INTERNAL MEDICINE

## 2025-08-04 PROCEDURE — 3078F DIAST BP <80 MM HG: CPT | Performed by: INTERNAL MEDICINE

## 2025-08-04 PROCEDURE — 99213 OFFICE O/P EST LOW 20 MIN: CPT | Performed by: INTERNAL MEDICINE

## 2025-08-04 PROCEDURE — 3077F SYST BP >= 140 MM HG: CPT | Performed by: INTERNAL MEDICINE

## 2025-08-04 RX ORDER — SPIRONOLACTONE 25 MG/1
25 TABLET ORAL DAILY
Qty: 90 TABLET | Refills: 3 | Status: SHIPPED | OUTPATIENT
Start: 2025-08-04